# Patient Record
Sex: MALE | Race: WHITE | NOT HISPANIC OR LATINO | Employment: OTHER | URBAN - METROPOLITAN AREA
[De-identification: names, ages, dates, MRNs, and addresses within clinical notes are randomized per-mention and may not be internally consistent; named-entity substitution may affect disease eponyms.]

---

## 2020-07-21 ENCOUNTER — AMBULATORY - HEALTHEAST (OUTPATIENT)
Dept: OTHER | Facility: CLINIC | Age: 40
End: 2020-07-21

## 2021-07-09 ENCOUNTER — HOSPITAL ENCOUNTER (EMERGENCY)
Dept: EMERGENCY MEDICINE | Facility: CLINIC | Age: 41
Discharge: HOME | End: 2021-07-09
Attending: EMERGENCY MEDICINE
Payer: COMMERCIAL

## 2021-07-09 DIAGNOSIS — R07.89 OTHER CHEST PAIN: ICD-10-CM

## 2021-07-09 LAB
ALBUMIN SERPL-MCNC: 4.1 G/DL (ref 3.5–5)
ALP SERPL-CCNC: 43 U/L (ref 45–120)
ALT SERPL W P-5'-P-CCNC: 32 U/L (ref 0–45)
ANION GAP SERPL CALCULATED.3IONS-SCNC: 7 MMOL/L (ref 5–18)
AST SERPL W P-5'-P-CCNC: 22 U/L (ref 0–40)
ATRIAL RATE - MUSE: 64 BPM
BASOPHILS # BLD AUTO: 0 THOU/UL (ref 0–0.2)
BASOPHILS NFR BLD AUTO: 1 % (ref 0–2)
BILIRUB SERPL-MCNC: 0.7 MG/DL (ref 0–1)
BUN SERPL-MCNC: 17 MG/DL (ref 8–22)
CALCIUM SERPL-MCNC: 9.4 MG/DL (ref 8.5–10.5)
CHLORIDE BLD-SCNC: 106 MMOL/L (ref 98–107)
CO2 SERPL-SCNC: 29 MMOL/L (ref 22–31)
CREAT SERPL-MCNC: 1.16 MG/DL (ref 0.7–1.3)
D DIMER PPP FEU-MCNC: <=0.27 FEU UG/ML
DIASTOLIC BLOOD PRESSURE - MUSE: NORMAL
EOSINOPHIL # BLD AUTO: 0.1 THOU/UL (ref 0–0.4)
EOSINOPHIL NFR BLD AUTO: 1 % (ref 0–6)
ERYTHROCYTE [DISTWIDTH] IN BLOOD BY AUTOMATED COUNT: 13.2 % (ref 11–14.5)
GFR SERPL CREATININE-BSD FRML MDRD: >60 ML/MIN/1.73M2
GLUCOSE BLD-MCNC: 87 MG/DL (ref 70–125)
HCT VFR BLD AUTO: 40.9 % (ref 40–54)
HGB BLD-MCNC: 13.7 G/DL (ref 14–18)
IMM GRANULOCYTES # BLD: 0 THOU/UL
IMM GRANULOCYTES NFR BLD: 1 %
INTERPRETATION ECG - MUSE: NORMAL
LYMPHOCYTES # BLD AUTO: 1.6 THOU/UL (ref 0.8–4.4)
LYMPHOCYTES NFR BLD AUTO: 19 % (ref 20–40)
MCH RBC QN AUTO: 29.9 PG (ref 27–34)
MCHC RBC AUTO-ENTMCNC: 33.5 G/DL (ref 32–36)
MCV RBC AUTO: 89 FL (ref 80–100)
MONOCYTES # BLD AUTO: 0.9 THOU/UL (ref 0–0.9)
MONOCYTES NFR BLD AUTO: 10 % (ref 2–10)
NEUTROPHILS # BLD AUTO: 5.6 THOU/UL (ref 2–7.7)
NEUTROPHILS NFR BLD AUTO: 68 % (ref 50–70)
P AXIS - MUSE: 47 DEGREES
PLATELET # BLD AUTO: 254 THOU/UL (ref 140–440)
PMV BLD AUTO: 9.1 FL (ref 8.5–12.5)
POTASSIUM BLD-SCNC: 4.2 MMOL/L (ref 3.5–5)
PR INTERVAL - MUSE: 146 MS
PROT SERPL-MCNC: 6.9 G/DL (ref 6–8)
QRS DURATION - MUSE: 84 MS
QT - MUSE: 378 MS
QTC - MUSE: 389 MS
R AXIS - MUSE: 40 DEGREES
RBC # BLD AUTO: 4.58 MILL/UL (ref 4.4–6.2)
SODIUM SERPL-SCNC: 142 MMOL/L (ref 136–145)
SYSTOLIC BLOOD PRESSURE - MUSE: NORMAL
T AXIS - MUSE: 54 DEGREES
TROPONIN I SERPL-MCNC: <0.01 NG/ML (ref 0–0.29)
VENTRICULAR RATE- MUSE: 64 BPM
WBC: 8.2 THOU/UL (ref 4–11)

## 2021-07-09 ASSESSMENT — MIFFLIN-ST. JEOR: SCORE: 1870.15

## 2021-07-09 NOTE — ED TRIAGE NOTES
ED Triage Notes by Janak Hawk, RN at 7/9/2021  6:21 PM     Author: Janak Hawk, RN Service: -- Author Type: Registered Nurse    Filed: 7/9/2021  6:23 PM Date of Service: 7/9/2021  6:21 PM Status: Signed    : Janak Hawk, RN (Registered Nurse)       Substernal chest pain since 0300 was seen at his clinic and then referred to the ER for further eval

## 2021-07-09 NOTE — ED NOTES
ED Notes by Mia Cuba MD at 7/9/2021  6:18 PM     Author: Mia Cuba MD Service: Emergency Medicine Author Type: Physician    Filed: 7/9/2021  6:20 PM Date of Service: 7/9/2021  6:18 PM Status: Signed    : Mia Cuba MD (Physician)       Expected Patient Referral to ED  6:18 PM    Referring Clinic/Provider:  [facility]    Reason for referral/Clinical facts:  40 M with sudden onset CP and shortness of breath at 0300. Woke him from sleep. Was drinking last night. Fell back asleep but woke up again with same. No URI sx. Worse with bending over. Vitals wnl. No chest wall tenderness on exam. Normal EKG.    Recommendations provided:  Send to ED    Caller was informed that this institution does possess the capabilities and/or resources to provide for patient and should be transferred to our institution.    Based on the information provided, discussed that this patient likely is not a good candidate for direct admission to this institution and that provider could proceed as such.  If however direct admit is sought and any hurdles encountered, this ED would be happy to see the patient and evaluate.    Informed caller that recommendations provided are recommendations based only on the facts provided and that they responsible to accept or reject the advice, or to seek a formal in person consultation as needed and that this ED will see/treat patient should they arrive.      Mia Cuba M.D.  Emergency Medicine  [facility] EMERGENCY ROOM  [facility] [facility] [address]  Dept: [phone]  Loc: [phone]       Mia Cuba MD  07/09/21 3407

## 2021-07-10 VITALS — BODY MASS INDEX: 26.77 KG/M2 | HEIGHT: 73 IN | WEIGHT: 202 LBS

## 2021-07-10 NOTE — ED PROVIDER NOTES
ED Provider Notes by Mia Cuba MD at 7/9/2021  7:06 PM     Author: Mia Cuba MD Service: Emergency Medicine Author Type: Physician    Filed: 7/9/2021  7:50 PM Date of Service: 7/9/2021  7:06 PM Status: Signed    : Mia Cuba MD (Physician)       EMERGENCY DEPARTMENT ENCOUNTER     NAME: Moo Addison   AGE: 40 y.o. male   YOB: 1980   MRN: [id number]   EVALUATION DATE & TIME: 7/9/2021  6:54 PM   PCP: Provider, No Primary Care     Chief Complaint   Patient presents with   ? Chest Pain   :    FINAL IMPRESSION       1. Other chest pain           ED COURSE & MEDICAL DECISION MAKING      Pertinent Labs & Imaging studies reviewed. (See chart for details)     7:07 PM Met with patient for initial interview and exam. Plan for care in the ED was discussed. I saw the patient wearing an N95, surgical mask, eye protection, and gloves.  7:45 PM Updated patient on workup results. We discussed the plan for discharge and the patient is agreeable. Reviewed supportive cares, symptomatic treatment, outpatient follow up, and reasons to return to the Emergency Department. Patient to be discharged by ED RN.     40 y.o. male  presents to the Emergency Department for evaluation of chest discomfort that is better when taking ibuprofen, worse with bending forward with some associated shortness of breath. Patient does not have any significant cardiac risk factors except hyperlipidemia and he is not PERC negative due to smoking. He has no hypoxia or tachycardia, clear lungs and is nontoxic-appearing. Initial Vitals Reviewed.     Differential includes costochondritis, pneumothorax, pneumonia, musculoskeletal pain, esophagitis though less likely with improvement with ibuprofen. Given his history I did also consider atypical ACS or pulmonary embolus. Pain has been ongoing for approximately 18 hours, so I did do an EKG and single troponin which are both negative including no signs of pericarditis.  "Troponin is negative which also rules out myocarditis. He is nontoxic-appearing. D-dimer was obtained and is negative and I feel this adequately rules out PE. Symptoms are not concerning for dissection. Chest x-ray does not show cardiomegaly, pneumothorax, pneumonia or other findings. At this time this suggests a benign musculoskeletal etiology and I discussed results with patient and wife. At this time he is comfortable with return precautions and discharge.       At the conclusion of the encounter I discussed the results of all of the tests and the disposition. The questions were answered. The patient or family acknowledged understanding and was agreeable with the care plan.         MEDICATIONS GIVEN IN THE EMERGENCY:   Medications   sodium chloride flush 10 mL (NS) (has no administration in time range)      NEW PRESCRIPTIONS STARTED AT TODAY'S ER VISIT   Current Discharge Medication List      CONTINUE these medications which have NOT CHANGED    Details   levothyroxine (SYNTHROID, LEVOTHROID) 75 MCG tablet Take 75 mcg by mouth.            ================================================================   HISTORY OF PRESENT ILLNESS       Patient information was obtained from: Patient   Use of Intrepreter: N/A   Moo Addison is a 40 y.o. male with history of hypercholesterolemia and intermittent smoking who presents for evaluation of chest pain.   Patient was drinking last night, went to bed, and woke up at 3:00 AM with sudden onset chest pain and shortness of breath. Patient took ibuprofen and was able to fall back asleep. He states he attempted to work today, but upon bending over it would feel as though \"everything falls forward in my body\" and he feels a pressure in his chest. When he got home from work he developed shortness of breath along with his chest pain. He took 800 mg of ibuprofen and symptoms improved. Patient was seen at urgent care prior to arrival and EKG was normal. He was referred here for " further evaluation.   He denies any history of heart problems or diabetes. Patient has a history of hypercholesterolemia and is an intermittent smoker. No family history of early heart attack. He denies any cough or rhinorrhea.    ================================================================    REVIEW OF SYSTEMS       Review of Systems   Constitutional: Negative for chills and fever.   HENT: Negative for ear pain, rhinorrhea and sore throat.    Eyes: Negative for visual disturbance.   Respiratory: Positive for shortness of breath. Negative for cough.    Cardiovascular: Positive for chest pain (pressure).   Gastrointestinal: Negative for abdominal pain and vomiting.   Genitourinary: Negative for decreased urine volume.   Musculoskeletal: Negative for gait problem.   Skin: Negative for rash.   Neurological: Negative for headaches.   Psychiatric/Behavioral: Negative for self-injury.   All other systems reviewed and are negative.      PAST HISTORY     PAST MEDICAL HISTORY:   No past medical history on file.   PAST SURGICAL HISTORY:   No past surgical history on file.   CURRENT MEDICATIONS:   No current facility-administered medications on file prior to encounter.      Current Outpatient Medications on File Prior to Encounter   Medication Sig   ? levothyroxine (SYNTHROID, LEVOTHROID) 75 MCG tablet Take 75 mcg by mouth.      ALLERGIES:   No Known Allergies   FAMILY HISTORY:   No family history on file.   SOCIAL HISTORY:   Social History     Socioeconomic History   ? Marital status: Single     Spouse name: Not on file   ? Number of children: Not on file   ? Years of education: Not on file   ? Highest education level: Not on file   Occupational History   ? Not on file   Social Needs   ? Financial resource strain: Not on file   ? Food insecurity     Worry: Not on file     Inability: Not on file   ? Transportation needs     Medical: Not on file     Non-medical: Not on file   Tobacco Use   ? Smoking status: Current Some Day  "Smoker   ? Smokeless tobacco: Current User   Substance and Sexual Activity   ? Alcohol use: Not on file   ? Drug use: Not on file   ? Sexual activity: Not on file   Lifestyle   ? Physical activity     Days per week: Not on file     Minutes per session: Not on file   ? Stress: Not on file   Relationships   ? Social connections     Talks on phone: Not on file     Gets together: Not on file     Attends [employer] service: Not on file     Active member of club or organization: Not on file     Attends meetings of clubs or organizations: Not on file     Relationship status: Not on file   ? Intimate partner violence     Fear of current or ex partner: Not on file     Emotionally abused: Not on file     Physically abused: Not on file     Forced sexual activity: Not on file   Other Topics Concern   ? Not on file   Social History Narrative   ? Not on file        VITALS  Patient Vitals for the past 24 hrs:   BP Temp Pulse Resp SpO2 Height Weight   07/09/21 1930 108/68 -- 78 22 100 % -- --   07/09/21 1915 103/60 -- 75 21 97 % -- --   07/09/21 1824 105/66 98.9  F (37.2  C) 68 18 98 % 6' 1\" (1.854 m) 202 lb (91.6 kg)        ================================================================    PHYSICAL EXAM     VITAL SIGNS: /68   Pulse 78   Temp 98.9  F (37.2  C)   Resp 22   Ht 6' 1\" (1.854 m)   Wt 202 lb (91.6 kg)   SpO2 100%   BMI 26.65 kg/m     Constitutional:  Awake, no acute distress   HENT:  Atraumatic, oropharynx without exudate or erythema, membranes moist  Lymph:  No adenopathy  Eyes: EOM intact, PERRL, no injection  Neck: Supple  Respiratory:  Clear to auscultation bilaterally, no wheezes or crackles   Cardiovascular:  Regular rate and rhythm, single S1 and S2   GI:  Soft, nontender, nondistended, no rebound or guarding   Musculoskeletal:  Moves all extremities, no lower extremity edema, no deformities    Skin:  Warm, dry  Neurologic:  Alert and oriented x3, no focal deficits noted "       ================================================================  LAB       All pertinent labs reviewed and interpreted.   Results for orders placed or performed during the hospital encounter of 07/09/21   Comprehensive metabolic panel   Result Value Ref Range    Sodium 142 136 - 145 mmol/L    Potassium 4.2 3.5 - 5.0 mmol/L    Chloride 106 98 - 107 mmol/L    CO2 29 22 - 31 mmol/L    Anion Gap, Calculation 7 5 - 18 mmol/L    Glucose 87 70 - 125 mg/dL    BUN 17 8 - 22 mg/dL    Creatinine 1.16 0.70 - 1.30 mg/dL    GFR MDRD Af Amer >60 >60 mL/min/1.73m2    GFR MDRD Non Af Amer >60 >60 mL/min/1.73m2    Bilirubin, Total 0.7 0.0 - 1.0 mg/dL    Calcium 9.4 8.5 - 10.5 mg/dL    Protein, Total 6.9 6.0 - 8.0 g/dL    Albumin 4.1 3.5 - 5.0 g/dL    Alkaline Phosphatase 43 (L) 45 - 120 U/L    AST 22 0 - 40 U/L    ALT 32 0 - 45 U/L   Troponin I   Result Value Ref Range    Troponin I <0.01 0.00 - 0.29 ng/mL   HM1 (CBC with Diff)   Result Value Ref Range    WBC 8.2 4.0 - 11.0 thou/uL    RBC 4.58 4.40 - 6.20 mill/uL    Hemoglobin 13.7 (L) 14.0 - 18.0 g/dL    Hematocrit 40.9 40.0 - 54.0 %    MCV 89 80 - 100 fL    MCH 29.9 27.0 - 34.0 pg    MCHC 33.5 32.0 - 36.0 g/dL    RDW 13.2 11.0 - 14.5 %    Platelets 254 140 - 440 thou/uL    MPV 9.1 8.5 - 12.5 fL    Neutrophils % 68 50 - 70 %    Lymphocytes % 19 (L) 20 - 40 %    Monocytes % 10 2 - 10 %    Eosinophils % 1 0 - 6 %    Basophils % 1 0 - 2 %    Immature Granulocyte % 1 (H) <=0 %    Neutrophils Absolute 5.6 2.0 - 7.7 thou/uL    Lymphocytes Absolute 1.6 0.8 - 4.4 thou/uL    Monocytes Absolute 0.9 0.0 - 0.9 thou/uL    Eosinophils Absolute 0.1 0.0 - 0.4 thou/uL    Basophils Absolute 0.0 0.0 - 0.2 thou/uL    Immature Granulocyte Absolute 0.0 <=0.0 thou/uL   D-dimer, Quantitative   Result Value Ref Range    D-Dimer, Quant <=0.27 <=0.50 FEU ug/mL   ECG 12 lead nursing unit performed   Result Value Ref Range    SYSTOLIC BLOOD PRESSURE      DIASTOLIC BLOOD PRESSURE      VENTRICULAR RATE 64  BPM    ATRIAL RATE 64 BPM    P-R INTERVAL 146 ms    QRS DURATION 84 ms    Q-T INTERVAL 378 ms    QTC CALCULATION (BEZET) 389 ms    P Axis 47 degrees    R AXIS 40 degrees    T AXIS 54 degrees    MUSE DIAGNOSIS       Normal sinus rhythm  Normal ECG  When compared with ECG of 09-JUL-2021 16:46,  No significant change was found  Confirmed by SEE ED PROVIDER NOTE FOR, ECG INTERPRETATION ([id number]),  SOLA RUSSO ([id number]) on 7/9/2021 6:57:06 PM          ===============================================================  RADIOLOGY       Reviewed all pertinent imaging. Please see official radiology report.   Xr Chest 2 Views    Result Date: 7/9/2021  EXAM: XR CHEST 2 VIEWS LOCATION: [facility] DATE/TIME: 7/9/2021 7:30 PM INDICATION: cp, sob COMPARISON: None.     Negative chest.         ================================================================  EKG       EKG reviewed interpreted by me shows sinus rhythm with rate of 64, normal axis,  with no acute ST or T wave changes  I have independently reviewed and interpreted the EKG(s) documented above.     ================================================================  PROCEDURES         I, Sherri Shoemaker, am serving as a scribe to document services personally performed by Dr. Cuba based on my observation and the provider's statements to me. I, Mia Cuba MD attest that Sherri Shoemaker is acting in a scribe capacity, has observed my performance of the services and has documented them in accordance with my direction.   Mia Cuba M.D.   Emergency Medicine   [facility] EMERGENCY ROOM  [address] [address]  Dept: [phone]  Loc: [phone]        Mia Cuba MD  07/09/21 1950

## 2021-11-28 ENCOUNTER — HOSPITAL ENCOUNTER (EMERGENCY)
Facility: CLINIC | Age: 41
Discharge: HOME | End: 2021-11-29
Admitting: PHYSICIAN ASSISTANT
Payer: COMMERCIAL

## 2021-11-28 VITALS
WEIGHT: 187 LBS | SYSTOLIC BLOOD PRESSURE: 100 MMHG | RESPIRATION RATE: 18 BRPM | HEART RATE: 72 BPM | DIASTOLIC BLOOD PRESSURE: 55 MMHG | TEMPERATURE: 97.7 F | HEIGHT: 73 IN | OXYGEN SATURATION: 98 % | BODY MASS INDEX: 24.78 KG/M2

## 2021-11-28 DIAGNOSIS — S61.419A HAND LACERATION: ICD-10-CM
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
## 2021-11-28 PROCEDURE — 99283 EMERGENCY DEPT VISIT LOW MDM: CPT

## 2021-11-28 PROCEDURE — 12002 RPR S/N/AX/GEN/TRNK2.6-7.5CM: CPT

## 2021-11-28 ASSESSMENT — ENCOUNTER SYMPTOMS
NUMBNESS: 0
LIGHT-HEADEDNESS: 1
WOUND: 1

## 2021-11-28 ASSESSMENT — MIFFLIN-ST. JEOR: SCORE: 1797.11

## 2021-11-29 PROCEDURE — 250N000013 HC RX MED GY IP 250 OP 250 PS 637: Performed by: PHYSICIAN ASSISTANT

## 2021-11-29 RX ORDER — GINSENG 100 MG
CAPSULE ORAL ONCE
Status: DISCONTINUED | OUTPATIENT
Start: 2021-11-29 | End: 2021-11-29 | Stop reason: HOSPADM

## 2021-11-29 RX ADMIN — AMOXICILLIN AND CLAVULANATE POTASSIUM 1 TABLET: 875; 125 TABLET, FILM COATED ORAL at 00:34

## 2021-11-29 NOTE — DISCHARGE INSTRUCTIONS
Please keep your wound clean and dry.  Please take the antibiotics as directed.    Sutures can be removed in 10 days.    If you develop worsening pain, swelling, drainage you need to return to the ED for repeat assessment.

## 2021-11-29 NOTE — ED TRIAGE NOTES
Pt presents to ED with c/o right hand laceration caused by a dog bite.  Pt states his own dog bit him around 2230 tonight.  Reports dog is UTD on vaccines.  Pt unaware of his last tetanus shot.

## 2021-11-29 NOTE — ED PROVIDER NOTES
EMERGENCY DEPARTMENT ENCOUNTER      NAME: Moo Addison  AGE: 41 year old male  YOB: 1980  MRN: [id number]  EVALUATION DATE & TIME: No admission date for patient encounter.    PCP: No primary care provider on file.    ED PROVIDER: Noman Gonzalez PA-C      Chief Complaint   Patient presents with     Dog Bite         FINAL IMPRESSION:  1. Hand laceration    2. Dog bite, initial encounter          MEDICAL DECISION MAKING:    Pertinent Labs & Imaging studies reviewed. (See chart for details)  41 year old male presents to the Emergency Department for evaluation of dog bite causing laceration to the right hand.    Patient's motor and sensory function is intact.  Tetanus up-to-date.  Will irrigate the wound thoroughly and repair to the best of my ability, and placing the patient on antibiotics.  No indication for labs or imaging at this time.  Sutures can be removed in 10 days.  We discussed signs and symptoms for which to return or seek repeat medical attention.        ED COURSE  11:35 PM I met with the patient, obtained history, performed an initial exam, and discussed options and plan for diagnostics and treatment here in the ED. Performed laceration repair and will discharge on a course of antibiotics. Patient is in agreement with this plan. Reviewed supportive cares, symptomatic treatment, outpatient follow up, and reasons to return to the Emergency Department.     At the conclusion of the encounter I discussed the results of all of the tests and the disposition. The questions were answered. The patient or family acknowledged understanding and was agreeable with the care plan.     MEDICATIONS GIVEN IN THE EMERGENCY:  Medications   bacitracin ointment (has no administration in time range)   amoxicillin-clavulanate (AUGMENTIN) 875-125 MG per tablet 1 tablet (1 tablet Oral Given 11/29/21 0034)       NEW PRESCRIPTIONS STARTED AT TODAY'S ER VISIT  Discharge Medication List as of 11/29/2021 12:40 AM       START taking these medications    Details   amoxicillin-clavulanate (AUGMENTIN) 875-125 MG tablet Take 1 tablet by mouth 2 times daily for 10 days, Disp-20 tablet, R-0, Local Print                  =================================================================    HPI    Patient information was obtained from: patient    Use of Interpretor: N/A         Moo Addison is a 41 year old male with no pertinent history who presents to this ED via private vehicle for evaluation of dog bite.    Patient reports being bit on the right hand by his dog around 2230 (~1 hour ago). He applied a disinfecting spray to the area and bleeding was controlled with pressure. Denies numbness or paresthesia of the area. He does endorse lightheadedness due to the sight of his blood. He believes his last tetanus shot was within the last 10 years.      REVIEW OF SYSTEMS   Review of Systems   Skin: Positive for wound (right hand laceration).   Neurological: Positive for light-headedness. Negative for numbness.        Negative for paresthesia of right hand   All other systems reviewed and are negative.         PAST MEDICAL HISTORY:  No past medical history on file.    PAST SURGICAL HISTORY:  No past surgical history on file.      CURRENT MEDICATIONS:      Current Facility-Administered Medications:      bacitracin ointment, , Topical, Once, Carlos, Noman DE LA GARZA PA-C    Current Outpatient Medications:      amoxicillin-clavulanate (AUGMENTIN) 875-125 MG tablet, Take 1 tablet by mouth 2 times daily for 10 days, Disp: 20 tablet, Rfl: 0     levothyroxine (SYNTHROID, LEVOTHROID) 75 MCG tablet, [LEVOTHYROXINE (SYNTHROID, LEVOTHROID) 75 MCG TABLET] Take 75 mcg by mouth., Disp: , Rfl:       ALLERGIES:  No Known Allergies    FAMILY HISTORY:  No family history on file.    SOCIAL HISTORY:   Social History     Socioeconomic History     Marital status: Single     Spouse name: Not on file     Number of children: Not on file     Years of education: Not on file  "    Highest education level: Not on file   Occupational History     Not on file   Tobacco Use     Smoking status: Current Some Day Smoker     Smokeless tobacco: Current User   Substance and Sexual Activity     Alcohol use: Not on file     Drug use: Not on file     Sexual activity: Not on file   Other Topics Concern     Not on file   Social History Narrative     Not on file     Social Determinants of Health     Financial Resource Strain: Not on file   Food Insecurity: Not on file   Transportation Needs: Not on file   Physical Activity: Not on file   Stress: Not on file   Social Connections: Not on file   Intimate Partner Violence: Not on file   Housing Stability: Not on file       VITALS:  Patient Vitals for the past 24 hrs:   BP Temp Temp src Pulse Resp SpO2 Height Weight   11/28/21 2339 100/55 97.7  F (36.5  C) Temporal 72 18 98 % 1.854 m (6' 1\") 84.8 kg (187 lb)       PHYSICAL EXAM    Physical Exam  Vitals and nursing note reviewed.   Constitutional:       General: He is not in acute distress.     Appearance: He is normal weight. He is not toxic-appearing.   HENT:      Head: Normocephalic.      Nose: Nose normal.   Eyes:      Conjunctiva/sclera: Conjunctivae normal.   Pulmonary:      Effort: Pulmonary effort is normal. No respiratory distress.   Musculoskeletal:         General: Normal range of motion.      Cervical back: Normal range of motion.      Comments: Patient does have an obvious dog bite palm of right hand.  This is approximately 5 to 6 cm in length and jagged.  Patient is able to flex and extend all digits freely showing good strength in full extension as well as flexion.  Distal sensation in all fingertips intact.  Normal capillary refill.  Bleeding is controlled.  No foreign objects when assessing the wound.   Skin:     Comments: Laceration of the right palm described above.   Neurological:      General: No focal deficit present.      Mental Status: He is alert.      Sensory: No sensory deficit.      " Motor: No weakness.   Psychiatric:         Mood and Affect: Mood normal.         Behavior: Behavior normal.            PROCEDURES:   PROCEDURE: Laceration Repair   INDICATIONS: Laceration   PROCEDURE PROVIDER: Noman Gonzalez PA-C   SITE: Right hand   TYPE/SIZE: simple, complex, clean and no foreign body visualized  6 cm (total length)   FUNCTIONAL ASSESSMENT: Distal sensation, circulation, motor and tendon function intact   MEDICATION: 6 mLs of 1% Lidocaine with epinephrine   PREPARATION: irrigation with Normal saline and Hibiclens   DEBRIDEMENT: no debridement and wound explored, no foreign body found   CLOSURE:  Wound was closed in   one layer: Skin closed with 12 stitches of 5-0 Prolene    Total number of sutures/staples placed: 12 simple interrupted sutures, bacitracin, nonadherent dressing, gauze wrap and Coban were placed as well.            I, Suad Conklin, am serving as a scribe to document services personally performed by Noman Gonzalez PA-C based on my observation and the provider's statements to me. I, Noman Gonzalez PA-C attest that Suad Conklin is acting in a scribe capacity, has observed my performance of the services and has documented them in accordance with my direction.    Noman Gonzalez PA-C  Emergency Medicine  [facility]      Noman Gonzalez PA-C  11/29/21 0100